# Patient Record
Sex: MALE | Race: WHITE | HISPANIC OR LATINO | Employment: FULL TIME | URBAN - METROPOLITAN AREA
[De-identification: names, ages, dates, MRNs, and addresses within clinical notes are randomized per-mention and may not be internally consistent; named-entity substitution may affect disease eponyms.]

---

## 2018-01-22 ENCOUNTER — HOSPITAL ENCOUNTER (EMERGENCY)
Facility: MEDICAL CENTER | Age: 28
End: 2018-01-23
Attending: EMERGENCY MEDICINE
Payer: COMMERCIAL

## 2018-01-22 VITALS
OXYGEN SATURATION: 98 % | HEIGHT: 73 IN | TEMPERATURE: 97.4 F | WEIGHT: 186.07 LBS | DIASTOLIC BLOOD PRESSURE: 100 MMHG | HEART RATE: 64 BPM | SYSTOLIC BLOOD PRESSURE: 137 MMHG | RESPIRATION RATE: 12 BRPM | BODY MASS INDEX: 24.66 KG/M2

## 2018-01-22 DIAGNOSIS — Z77.21 EXPOSURE TO BLOOD OR BODY FLUID: ICD-10-CM

## 2018-01-22 LAB
ALBUMIN SERPL BCP-MCNC: 4.5 G/DL (ref 3.2–4.9)
ALP SERPL-CCNC: 65 U/L (ref 30–99)
ALT SERPL-CCNC: 28 U/L (ref 2–50)
AST SERPL-CCNC: 21 U/L (ref 12–45)
BILIRUB CONJ SERPL-MCNC: 0.2 MG/DL (ref 0.1–0.5)
BILIRUB INDIRECT SERPL-MCNC: 0.7 MG/DL (ref 0–1)
BILIRUB SERPL-MCNC: 0.9 MG/DL (ref 0.1–1.5)
PROT SERPL-MCNC: 7.9 G/DL (ref 6–8.2)

## 2018-01-22 PROCEDURE — 99283 EMERGENCY DEPT VISIT LOW MDM: CPT

## 2018-01-22 PROCEDURE — 87389 HIV-1 AG W/HIV-1&-2 AB AG IA: CPT

## 2018-01-22 PROCEDURE — 80076 HEPATIC FUNCTION PANEL: CPT

## 2018-01-22 ASSESSMENT — PAIN SCALES - GENERAL: PAINLEVEL_OUTOF10: 0
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
## 2018-01-23 LAB — HIV 1+2 AB+HIV1 P24 AG SERPL QL IA: NON REACTIVE

## 2018-01-23 NOTE — ED NOTES
Pt AOx4.  Pt given discharge instructions and pt verbalized understanding.  Pt ambulated to [facility].
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
## 2018-01-23 NOTE — DISCHARGE INSTRUCTIONS
Body Fluid Exposure  Body fluid exposure happens most often with blood and sexual contact. It also happens by sharing needles. Most of the time this type of exposure does not cause any problems. Several infections can be passed by body fluid exposure. The biggest risk is for getting hepatitis B or hepatitis C, or HIV infection (the virus that causes AIDS).  Hepatitis B and hepatitis C cause a serious liver infection. This can cause death. Immunization against hepatitis B can prevent this infection. Your caregiver may want you to get these shots. All health care workers or those exposed to human body fluids regularly should be immunized against hepatitis B. This requires a first dose with booster doses at 1 and 6 months. There is no hepatitis C vaccine. There is no vaccine against AIDS.  The risk of transmitting HIV infection by a single body fluid exposure is very small. Blood exposure to mucous membranes has on average caused 1 HIV infection for every 1,000 exposures if the source is known to carry HIV. About 1 in 300 needle stick recipients from a known HIV positive person get infected. Infection with HIV is very serious. High risk exposures should consider post-exposure preventive treatment. Treatment reduces the chance of getting an HIV infection.  A combination of anti-HIV drugs is recommended for risky exposures. Preventive treatment should be started as soon as possible. It is usually continued for 4 weeks. Blood tests for HIV should be taken immediately, and repeated at 3 to 6 weeks and again at 3 and 6 months.   Arrange follow-up with your caregiver.  Document Released: 12/18/2006 Document Revised: 03/11/2013 Document Reviewed: 06/06/2008  [email or web address][email or web address]® Patient Information ©2014 [employer].

## 2022-02-25 ENCOUNTER — OFFICE VISIT (OUTPATIENT)
Dept: MEDICAL GROUP | Facility: MEDICAL CENTER | Age: 32
End: 2022-02-25
Payer: COMMERCIAL

## 2022-02-25 VITALS
RESPIRATION RATE: 16 BRPM | HEIGHT: 73 IN | TEMPERATURE: 96.5 F | WEIGHT: 167.55 LBS | HEART RATE: 75 BPM | DIASTOLIC BLOOD PRESSURE: 64 MMHG | SYSTOLIC BLOOD PRESSURE: 128 MMHG | OXYGEN SATURATION: 95 % | BODY MASS INDEX: 22.21 KG/M2

## 2022-02-25 DIAGNOSIS — Z23 NEED FOR VACCINATION: ICD-10-CM

## 2022-02-25 DIAGNOSIS — M54.2 NECK PAIN: ICD-10-CM

## 2022-02-25 DIAGNOSIS — M54.42 ACUTE LEFT-SIDED LOW BACK PAIN WITH LEFT-SIDED SCIATICA: ICD-10-CM

## 2022-02-25 DIAGNOSIS — M54.6 ACUTE BILATERAL THORACIC BACK PAIN: ICD-10-CM

## 2022-02-25 PROBLEM — M54.50 ACUTE BILATERAL LOW BACK PAIN WITHOUT SCIATICA: Status: ACTIVE | Noted: 2022-02-25

## 2022-02-25 PROCEDURE — 90471 IMMUNIZATION ADMIN: CPT | Performed by: FAMILY MEDICINE

## 2022-02-25 PROCEDURE — 99204 OFFICE O/P NEW MOD 45 MIN: CPT | Mod: 25 | Performed by: FAMILY MEDICINE

## 2022-02-25 PROCEDURE — 90715 TDAP VACCINE 7 YRS/> IM: CPT | Performed by: FAMILY MEDICINE

## 2022-02-25 RX ORDER — CYCLOBENZAPRINE HCL 5 MG
5 TABLET ORAL NIGHTLY PRN
Qty: 30 TABLET | Refills: 0 | Status: SHIPPED
Start: 2022-02-25 | End: 2022-06-09

## 2022-02-25 ASSESSMENT — ENCOUNTER SYMPTOMS
WHEEZING: 0
COUGH: 0
BLOOD IN STOOL: 0
CONSTIPATION: 0
PALPITATIONS: 0
NECK PAIN: 1
WEAKNESS: 0
FEVER: 0
SORE THROAT: 0
BACK PAIN: 1
NERVOUS/ANXIOUS: 0
CHILLS: 0
DIARRHEA: 0
TINGLING: 1
FOCAL WEAKNESS: 0
DEPRESSION: 0
ABDOMINAL PAIN: 0
VOMITING: 0
SHORTNESS OF BREATH: 0

## 2022-02-25 ASSESSMENT — PATIENT HEALTH QUESTIONNAIRE - PHQ9: CLINICAL INTERPRETATION OF PHQ2 SCORE: 0

## 2022-02-25 NOTE — PROGRESS NOTES
FAMILY MEDICINE VISIT                                                               Chief complaint::Diagnoses of Neck pain, Acute bilateral thoracic back pain, Acute left-sided low back pain with left-sided sciatica, and Need for vaccination were pertinent to this visit.    History of present illness: Octavio Joy is a 31 y.o. male who presented to establish care, to talk about back pain.    He reports that he has been having neck pain, thoracic back pain as well as lower back pain since a year.  He reports that he works at [employer] and does lift 50 pound weight boxes at work.  He denies any direct trauma to his back, neck.  Reports that sometimes pain is 10 out of 10.  No numbness and tingling in upper extremities.  No weakness in upper extremities.  He gets numbness and tingling in buttock region on left side.  Denies any urinary or bowel incontinence, saddle anesthesia.    Review of systems:     Review of Systems   Constitutional: Negative for chills, fever and malaise/fatigue.   HENT: Negative for ear discharge, ear pain and sore throat.    Respiratory: Negative for cough, shortness of breath and wheezing.    Cardiovascular: Negative for chest pain, palpitations and leg swelling.   Gastrointestinal: Negative for abdominal pain, blood in stool, constipation, diarrhea and vomiting.   Musculoskeletal: Positive for back pain and neck pain.   Skin: Negative for rash.   Neurological: Positive for tingling. Negative for focal weakness and weakness.   Psychiatric/Behavioral: Negative for depression. The patient is not nervous/anxious.         Medications and Allergies:     Current Outpatient Medications   Medication Sig Dispense Refill   • cyclobenzaprine (FLEXERIL) 5 mg tablet Take 1 Tablet by mouth at bedtime as needed for Moderate Pain or Muscle Spasms. 30 Tablet 0     No current facility-administered medications for this visit.          Vitals:    /64 (BP Location: Left arm, Patient Position:  "Sitting, BP Cuff Size: Adult)   Pulse 75   Temp 35.8 °C (96.5 °F)   Resp 16   Ht 1.854 m (6' 1\")   Wt 76 kg (167 lb 8.8 oz)   SpO2 95%  Body mass index is 22.11 kg/m².    Physical Exam:     Physical Exam  Constitutional:       General: He is not in acute distress.  HENT:      Head: Normocephalic and atraumatic.      Right Ear: External ear normal.      Left Ear: External ear normal.      Nose: Nose normal.   Eyes:      Conjunctiva/sclera: Conjunctivae normal.   Cardiovascular:      Rate and Rhythm: Normal rate and regular rhythm.      Heart sounds: Normal heart sounds. No murmur heard.    No friction rub. No gallop.   Pulmonary:      Effort: Pulmonary effort is normal. No respiratory distress.      Breath sounds: Normal breath sounds. No wheezing or rales.   Musculoskeletal:         General: No deformity.      Cervical back: Neck supple.      Comments: Bilateral paraspinal tenderness at neck area, thoracic spine area and lower back.  Full range of motion at neck.  Upper extremity strength and sensation intact.  Straight leg test negative bilaterally.  Lower extremity strength and sensation intact also.   Skin:     Findings: No rash.   Neurological:      General: No focal deficit present.      Mental Status: He is alert. Mental status is at baseline.      Sensory: No sensory deficit.      Motor: No weakness.      Coordination: Coordination normal.      Gait: Gait is intact. Gait normal.      Deep Tendon Reflexes: Reflexes normal.   Psychiatric:         Mood and Affect: Mood and affect normal.         Judgment: Judgment normal.              Assessment/Plan:    1. Neck pain  New health problem, uncontrolled, no red flag signs, likely muscle strain from lifting heavy weight at work.  Check cervical x-ray.  Referral to physical therapy.  Prescribed muscle relaxer 5 mg at bedtime as needed.  Discussed this medication can cause drowsiness.  Do not drink alcohol with this medication.  Do not drive after taking this " medication. Recommended to use Voltaren gel as needed for pain.  Take Tylenol also as needed for pain.    - DX-CERVICAL SPINE-2 OR 3 VIEWS; Future  - Referral to Physical Therapy  - cyclobenzaprine (FLEXERIL) 5 mg tablet; Take 1 Tablet by mouth at bedtime as needed for Moderate Pain or Muscle Spasms.  Dispense: 30 Tablet; Refill: 0    2. Acute bilateral thoracic back pain  New health problem, uncontrolled, no red flag signs, likely muscle strain from lifting heavy weight at work.  Check cervical x-ray.  Referral to physical therapy.  Prescribed muscle relaxer 5 mg at bedtime as needed.  Discussed this medication can cause drowsiness.  Do not drink alcohol with this medication.  Do not drive after taking this medication. Recommended to use Voltaren gel as needed for pain.  Take Tylenol also as needed for pain.    - DX-THORACIC SPINE-2 VIEWS; Future  - Referral to Physical Therapy  - cyclobenzaprine (FLEXERIL) 5 mg tablet; Take 1 Tablet by mouth at bedtime as needed for Moderate Pain or Muscle Spasms.  Dispense: 30 Tablet; Refill: 0    3. Acute left-sided low back pain with left-sided sciatica  New health problem, uncontrolled, no red flag signs, likely muscle strain from lifting heavy weight at work.  Check cervical x-ray.  Referral to physical therapy.  Prescribed muscle relaxer 5 mg at bedtime as needed.  Discussed this medication can cause drowsiness.  Do not drink alcohol with this medication.  Do not drive after taking this medication. Recommended to use Voltaren gel as needed for pain.  Take Tylenol also as needed for pain.    - DX-LUMBAR SPINE-2 OR 3 VIEWS; Future  - Referral to Physical Therapy  - cyclobenzaprine (FLEXERIL) 5 mg tablet; Take 1 Tablet by mouth at bedtime as needed for Moderate Pain or Muscle Spasms.  Dispense: 30 Tablet; Refill: 0    4. Need for vaccination  - Tdap =>6yo IM    Please note that this dictation was created using voice recognition software. I have made every reasonable attempt to  correct obvious errors, but I expect that there are errors of grammar and possibly content that I did not discover before finalizing the note.    Follow up in 3 months for follow-up.

## 2022-06-09 ENCOUNTER — OFFICE VISIT (OUTPATIENT)
Dept: MEDICAL GROUP | Facility: MEDICAL CENTER | Age: 32
End: 2022-06-09
Payer: COMMERCIAL

## 2022-06-09 VITALS
RESPIRATION RATE: 16 BRPM | OXYGEN SATURATION: 100 % | BODY MASS INDEX: 21.04 KG/M2 | WEIGHT: 158.73 LBS | TEMPERATURE: 97.2 F | SYSTOLIC BLOOD PRESSURE: 110 MMHG | HEIGHT: 73 IN | HEART RATE: 65 BPM | DIASTOLIC BLOOD PRESSURE: 62 MMHG

## 2022-06-09 DIAGNOSIS — M54.2 NECK PAIN: ICD-10-CM

## 2022-06-09 DIAGNOSIS — Z00.00 ANNUAL PHYSICAL EXAM: ICD-10-CM

## 2022-06-09 PROCEDURE — 99395 PREV VISIT EST AGE 18-39: CPT | Performed by: FAMILY MEDICINE

## 2022-06-09 ASSESSMENT — ENCOUNTER SYMPTOMS
EYE PAIN: 0
NERVOUS/ANXIOUS: 0
EYE DISCHARGE: 0
SINUS PAIN: 0
DIZZINESS: 0
SPUTUM PRODUCTION: 0
CONSTIPATION: 0
HEADACHES: 0
SHORTNESS OF BREATH: 0
CHILLS: 0
WHEEZING: 0
ABDOMINAL PAIN: 0
NAUSEA: 0
PALPITATIONS: 0
COUGH: 0
SENSORY CHANGE: 0
DIARRHEA: 0
VOMITING: 0
FEVER: 0
MYALGIAS: 0
DEPRESSION: 0
HEMOPTYSIS: 0
EYE REDNESS: 0
WEIGHT LOSS: 0
FOCAL WEAKNESS: 0

## 2022-06-09 NOTE — PROGRESS NOTES
31 y.o. female for annual  exam.    Chief Complaint.  Chief Complaint   Patient presents with   • Follow-Up       History of present illness:    At last visit I saw him for neck pain, back pain, reports improvement in his symptoms.  He stopped taking medication for that.  He has not done physical therapy or x-rays.  He would like to see chiropractor for his neck pain.    Health Maintenance  Advanced directive: n/a  Osteoporosis Screen/ DEXA: n/a  PT/vit D for falls prevention: n/a   Cholesterol Screening: n/a  Diabetes Screening: n/a  AAA Screening (65 to 74 year male): n/a   Aspirin Use: n/a  Diet: Eats healthy diet  Exercise: Is physically active  Substance Abuse: None  Seat belts, bike helmet, gun safety discussed.  Sun protection used.    Cancer screening  Colorectal Cancer Screening: No symptoms, no family history  Lung Cancer Screening: He does not smoke  Prostate Cancer Screening/PSA: No symptoms, no family history      Infectious disease screening/Immunizations  --STI Screening: None  --Practices safe sex.  --Immunizations: Up-to-date except COVID-vaccine.  He is not interested in getting COVID-vaccine.       Review of systems:    Review of Systems   Constitutional: Negative for chills, fever, malaise/fatigue and weight loss.   HENT: Negative for ear discharge, ear pain, hearing loss and sinus pain.    Eyes: Negative for pain, discharge and redness.   Respiratory: Negative for cough, hemoptysis, sputum production, shortness of breath and wheezing.    Cardiovascular: Negative for chest pain, palpitations and leg swelling.   Gastrointestinal: Negative for abdominal pain, constipation, diarrhea, nausea and vomiting.   Genitourinary: Negative for dysuria, frequency and urgency.   Musculoskeletal: Negative for joint pain and myalgias.        Neck pain sometimes   Skin: Negative for itching and rash.   Neurological: Negative for dizziness, sensory change, focal weakness and headaches.   Endo/Heme/Allergies:  "Negative for environmental allergies.   Psychiatric/Behavioral: Negative for depression. The patient is not nervous/anxious.         Medications and Allergies:     No current outpatient medications on file.     No current facility-administered medications for this visit.        No Known Allergies    Vitals:    /62 (BP Location: Left arm, Patient Position: Sitting, BP Cuff Size: Adult)   Pulse 65   Temp 36.2 °C (97.2 °F)   Resp 16   Ht 1.854 m (6' 1\")   Wt 72 kg (158 lb 11.7 oz)   SpO2 100%  Body mass index is 20.94 kg/m².    Physical Exam:   Physical Exam  Constitutional:       General: He is not in acute distress.     Appearance: He is not diaphoretic.   HENT:      Head: Normocephalic and atraumatic.      Right Ear: Tympanic membrane, ear canal and external ear normal.      Left Ear: Tympanic membrane, ear canal and external ear normal.      Nose: Nose normal.      Mouth/Throat:      Mouth: Mucous membranes are moist.      Pharynx: Oropharynx is clear. No oropharyngeal exudate or posterior oropharyngeal erythema.   Eyes:      General:         Right eye: No discharge.         Left eye: No discharge.      Conjunctiva/sclera: Conjunctivae normal.   Neck:      Thyroid: No thyromegaly.   Cardiovascular:      Rate and Rhythm: Normal rate and regular rhythm.      Heart sounds: Normal heart sounds. No murmur heard.  Pulmonary:      Effort: Pulmonary effort is normal. No respiratory distress.      Breath sounds: Normal breath sounds. No wheezing or rales.   Abdominal:      General: Bowel sounds are normal. There is no distension.      Palpations: Abdomen is soft.      Tenderness: There is no abdominal tenderness. There is no guarding.   Musculoskeletal:         General: No deformity. Normal range of motion.      Cervical back: Neck supple.   Skin:     General: Skin is warm.      Findings: No rash.   Neurological:      General: No focal deficit present.      Mental Status: He is alert. Mental status is at " baseline.      Gait: Gait is intact.   Psychiatric:         Mood and Affect: Mood and affect normal.         Judgment: Judgment normal.            Assessment/Plan:    Octavio was seen today for follow-up.    Diagnoses and all orders for this visit:    Annual physical exam  Up-to-date for screenings and immunizations except COVID-vaccine which he is not interested in getting.  Continue to eat healthy diet and exercise 150 minutes in a week.    Neck pain  Referral to chiropractor.  Neck pain is  musculoskeletal.  Recommended to do stretching exercises at home.  -     Referral to Chiropractic         Follow up in 1 year for annual physical

## 2023-09-29 ENCOUNTER — OFFICE VISIT (OUTPATIENT)
Dept: MEDICAL GROUP | Facility: MEDICAL CENTER | Age: 33
End: 2023-09-29

## 2023-09-29 ENCOUNTER — HOSPITAL ENCOUNTER (OUTPATIENT)
Dept: LAB | Facility: MEDICAL CENTER | Age: 33
End: 2023-09-29
Attending: STUDENT IN AN ORGANIZED HEALTH CARE EDUCATION/TRAINING PROGRAM

## 2023-09-29 ENCOUNTER — APPOINTMENT (OUTPATIENT)
Dept: URGENT CARE | Facility: CLINIC | Age: 33
End: 2023-09-29

## 2023-09-29 VITALS
HEIGHT: 73 IN | HEART RATE: 60 BPM | RESPIRATION RATE: 17 BRPM | OXYGEN SATURATION: 96 % | BODY MASS INDEX: 23.86 KG/M2 | TEMPERATURE: 98.3 F | DIASTOLIC BLOOD PRESSURE: 64 MMHG | SYSTOLIC BLOOD PRESSURE: 124 MMHG | WEIGHT: 180 LBS

## 2023-09-29 DIAGNOSIS — Z11.3 SCREEN FOR STD (SEXUALLY TRANSMITTED DISEASE): ICD-10-CM

## 2023-09-29 DIAGNOSIS — Z20.2 EXPOSURE TO CHLAMYDIA: ICD-10-CM

## 2023-09-29 LAB
HBV SURFACE AB SERPL IA-ACNC: <3.5 MIU/ML (ref 0–10)
HBV SURFACE AG SER QL: NORMAL
HCV AB SER QL: NORMAL
HIV 1+2 AB+HIV1 P24 AG SERPL QL IA: NORMAL
T PALLIDUM AB SER QL IA: NORMAL

## 2023-09-29 PROCEDURE — 99214 OFFICE O/P EST MOD 30 MIN: CPT | Performed by: STUDENT IN AN ORGANIZED HEALTH CARE EDUCATION/TRAINING PROGRAM

## 2023-09-29 PROCEDURE — 87591 N.GONORRHOEAE DNA AMP PROB: CPT

## 2023-09-29 PROCEDURE — 87389 HIV-1 AG W/HIV-1&-2 AB AG IA: CPT

## 2023-09-29 PROCEDURE — 86803 HEPATITIS C AB TEST: CPT

## 2023-09-29 PROCEDURE — 87491 CHLMYD TRACH DNA AMP PROBE: CPT

## 2023-09-29 PROCEDURE — 86780 TREPONEMA PALLIDUM: CPT

## 2023-09-29 PROCEDURE — 36415 COLL VENOUS BLD VENIPUNCTURE: CPT

## 2023-09-29 PROCEDURE — 86706 HEP B SURFACE ANTIBODY: CPT

## 2023-09-29 PROCEDURE — 87340 HEPATITIS B SURFACE AG IA: CPT

## 2023-09-29 PROCEDURE — 3074F SYST BP LT 130 MM HG: CPT | Performed by: STUDENT IN AN ORGANIZED HEALTH CARE EDUCATION/TRAINING PROGRAM

## 2023-09-29 PROCEDURE — 3078F DIAST BP <80 MM HG: CPT | Performed by: STUDENT IN AN ORGANIZED HEALTH CARE EDUCATION/TRAINING PROGRAM

## 2023-09-29 RX ORDER — DOXYCYCLINE 100 MG/1
100 TABLET ORAL 2 TIMES DAILY
Qty: 14 TABLET | Refills: 0 | Status: SHIPPED | OUTPATIENT
Start: 2023-09-29

## 2023-09-29 ASSESSMENT — PATIENT HEALTH QUESTIONNAIRE - PHQ9: CLINICAL INTERPRETATION OF PHQ2 SCORE: 0

## 2023-09-29 NOTE — PROGRESS NOTES
"Subjective:     CC: STD check    HPI:   Octavio presents today for STD check    Problem   Screen for Std (Sexually Transmitted Disease)    Patient requesting screening for STDs. No history of STDs in the past.         Exposure to Chlamydia    Recent condition. His partner tested positive for chlamydia this week.    Character: burning with urination  Onset: this week  Location: penile  Duration: constant  Exacerbating factors: urination  Relieving factors: none  Associated symptoms: none  Severity: persistent         Current Outpatient Medications Ordered in Epic   Medication Sig Dispense Refill    doxycycline monohydrate (ADOXA) 100 MG tablet Take 1 Tablet by mouth 2 times a day. 14 Tablet 0     No current Epic-ordered facility-administered medications on file.     ROS:  See HPI    Objective:     Exam:  /64   Pulse 60   Temp 36.8 °C (98.3 °F)   Resp 17   Ht 1.854 m (6' 1\")   Wt 81.6 kg (180 lb)   SpO2 96%   BMI 23.75 kg/m²  Body mass index is 23.75 kg/m².    Gen: Alert and oriented, No apparent distress.  : Normal appearing circumcised penis without skin lesions or penile discharge. No TTP to testicles.    Assessment & Plan:     32 y.o. male with the following -     1. Exposure to chlamydia  Acute condition. Will treat empirically with doxycycline per order. Follow up results per orders and adjust medication as needed. Will notify patient with results. Discussed and advised safe sex practices.  - doxycycline monohydrate (ADOXA) 100 MG tablet; Take 1 Tablet by mouth 2 times a day.  Dispense: 14 Tablet; Refill: 0    2. Screen for STD (sexually transmitted disease)  - Chlamydia/GC, PCR (Urine); Future  - T.PALLIDUM AB STIVEN (SCREENING); Future  - HIV AG/AB COMBO ASSAY SCREENING; Future  - HEP C VIRUS ANTIBODY; Future  - HEP B SURFACE AB; Future  - HEP B SURFACE ANTIGEN; Future      Return if symptoms worsen or fail to improve.    Please note that this dictation was created using voice recognition software. " I have made every reasonable attempt to correct obvious errors, but I expect that there are errors of grammar and possibly content that I did not discover before finalizing the note.

## 2023-09-30 LAB
C TRACH DNA SPEC QL NAA+PROBE: POSITIVE
N GONORRHOEA DNA SPEC QL NAA+PROBE: NEGATIVE
SPECIMEN SOURCE: ABNORMAL

## 2023-10-12 ENCOUNTER — PATIENT MESSAGE (OUTPATIENT)
Dept: MEDICAL GROUP | Facility: MEDICAL CENTER | Age: 33
End: 2023-10-12
Payer: COMMERCIAL

## 2023-10-12 DIAGNOSIS — Z20.2 EXPOSURE TO CHLAMYDIA: ICD-10-CM

## 2023-10-13 RX ORDER — DOXYCYCLINE 100 MG/1
100 TABLET ORAL 2 TIMES DAILY
Qty: 14 TABLET | Refills: 0 | OUTPATIENT
Start: 2023-10-13